# Patient Record
Sex: MALE | Race: BLACK OR AFRICAN AMERICAN | NOT HISPANIC OR LATINO | Employment: FULL TIME | URBAN - METROPOLITAN AREA
[De-identification: names, ages, dates, MRNs, and addresses within clinical notes are randomized per-mention and may not be internally consistent; named-entity substitution may affect disease eponyms.]

---

## 2024-04-25 ENCOUNTER — OFFICE VISIT (OUTPATIENT)
Dept: OTOLARYNGOLOGY | Facility: CLINIC | Age: 29
End: 2024-04-25
Payer: COMMERCIAL

## 2024-04-25 VITALS — WEIGHT: 220 LBS | HEIGHT: 71 IN | BODY MASS INDEX: 30.8 KG/M2

## 2024-04-25 DIAGNOSIS — J34.89 NASAL AND SINUS DISCHARGE: ICD-10-CM

## 2024-04-25 DIAGNOSIS — J34.2 NASAL SEPTAL DEVIATION: ICD-10-CM

## 2024-04-25 DIAGNOSIS — R04.0 EPISTAXIS: ICD-10-CM

## 2024-04-25 DIAGNOSIS — J32.8 OTHER CHRONIC SINUSITIS: Primary | ICD-10-CM

## 2024-04-25 DIAGNOSIS — J34.89 NASAL OBSTRUCTION: ICD-10-CM

## 2024-04-25 PROBLEM — R10.11 ABDOMINAL PAIN, RIGHT UPPER QUADRANT: Status: ACTIVE | Noted: 2020-12-14

## 2024-04-25 PROBLEM — K81.9 CHOLECYSTITIS: Status: ACTIVE | Noted: 2020-12-14

## 2024-04-25 PROCEDURE — 99204 OFFICE O/P NEW MOD 45 MIN: CPT | Performed by: NURSE PRACTITIONER

## 2024-04-25 PROCEDURE — 31231 NASAL ENDOSCOPY DX: CPT | Performed by: NURSE PRACTITIONER

## 2024-04-25 PROCEDURE — 1036F TOBACCO NON-USER: CPT | Performed by: NURSE PRACTITIONER

## 2024-04-25 RX ORDER — DOXYCYCLINE 100 MG/1
100 TABLET ORAL 2 TIMES DAILY
Qty: 20 TABLET | Refills: 0 | Status: SHIPPED | OUTPATIENT
Start: 2024-04-25 | End: 2024-05-05

## 2024-04-25 RX ORDER — AMOXICILLIN 875 MG/1
875 TABLET, FILM COATED ORAL 2 TIMES DAILY
COMMUNITY
Start: 2024-04-21 | End: 2024-05-01

## 2024-04-25 RX ORDER — MUPIROCIN 20 MG/G
1 OINTMENT TOPICAL
Qty: 30 G | Refills: 0 | Status: SHIPPED | OUTPATIENT
Start: 2024-04-25 | End: 2024-08-03

## 2024-04-25 NOTE — PROGRESS NOTES
Subjective   Patient ID: Jakub Goins is a 28 y.o. male who presents for sinus issues .  HPI  Jakub Goins is a 28 y.o. old male   presenting with complaints of sinus and nose problems that began about 1 week ago. He notes chronic history of sinus issues and these exacerbations occur every few months where they peak. The patient describes the sinus/nose problems as gradual onset of facial pressure, headaches, posterior nasal drainage (colored). He has intermittent nasal obstruction. Patient denies rhinorrhea, facial pain, periorbital edema, throat clearing, hoarseness, loss of taste, and loss of smell. The patient reports epistaxis, frequently out of the left nostril. The patient has taken antibiotics, claritin D, afrin medications to alleviate the problem. The medications have not helped. The patient has not tried nasal saline irrigations. Does not have a history of allergic rhinitis or allergies. They deny a history of aspirin sensitivity. They report 3-4 sinus infections per year requiring antibiotic treatment. Most recent antibiotic usage includes: Amoxicillin x 7 days, started 5 days ago after being at urgent care.     History of prior nasal/sinus surgery or procedure: Denies    Review of Systems  Review of systems is negative for constitutional, ophthalmological, cardiac, pulmonary, renal, gastrointestinal, musculoskeletal, mental health, endocrine, or neurologic disorders (except as listed in the HPI, PMH, and Problem List).     Objective   Physical Exam  CONSTITUTIONAL: Vital signs reviewed. Patient appears well developed and well nourished.   GENERAL: this is a healthy appearing male who appears stated age. The patient is alert and appropriately verbally conversant without hoarseness. This patient is in no apparent distress.   FACE: The face was inspected and no cutaneous masses or lesions were visualized. There was no erythema or edema noted. Facial movement was symmetric. No skin lesions were  detected. There was no sinus tenderness elicited. TMJ crepitus absent.   EYES: Extra-ocular muscle function was intact. No nystagmus was observed. Pupils were equal.   CRANIAL NERVES: Cranial nerves II, III, IV, and VI were noted to be intact via extra-ocular muscle movement testing. Cranial nerve VII noted to be intact and symmetric by facial movement. Cranial nerves IX and X noted to be intact by gag reflex and palatal movement. Cranial nerve XII noted to be intact by active and symmetric tongue movement.   NOSE: Examination of the nose revealed the nasal dorsum to be midline. Intranasal exam reveals the septum is deviated right. There are thick scabs along the septum. The inferior turbinates were hypertrophic. No masses, polyps, mucopus, or other lesion on anterior rhinoscopy. See below procedure note as applicable for further exam.  ORAL CAVITY: Examination of the oral cavity revealed no mass lesions nor infection. The palate was noted to be intact. The tongue exhibited normal mobility. Mucosa was moist without lesion. The lips were free of lesion. Gums were free of inflammation. Dentition: normal without obvious infection or inflammation  OROPHARYNX: The oral pharynx was free of mass lesion or mucosal abnormality. The palate was noted to be without lesion. The uvula was normal appearing. The tonsils were Normal.  EARS: Examination of the ears revealed that the auricles were normally formed with no lesions. The external auditory canals were normal. The tympanic membranes were intact.  There is no inflammation visualized.   NECK: Visualization and palpation of the neck revealed no mass lesions. No skin lesions or inflammatory processes were detected. The cervical musculature was normal to palpation.   CERVICAL LYMPHATICS: There were no palpable lymph nodes in the posterior triangle, submandibular triangle, jugulodigastric region, or central neck.  RESPIRATORY: Normal inspiration and expiration and chest wall  expansion, no use of accessory muscles to breathe, no stridor.  NEUROLOGICAL: Patient is ambulatory without assist. Mentation is clear. Answering questions appropriately.     Nasal / sinus endoscopy    Date/Time: 4/25/2024 2:12 PM    Performed by: IRAM Mcdonald  Authorized by: IRAM Mcdonald    Consent:     Consent obtained:  Verbal    Consent given by:  Patient    Risks discussed:  Bleeding, infection and pain    Alternatives discussed:  No treatment, observation and delayed treatment  Procedure details:     Indications: assessment of airway and sino-nasal symptoms      Medication:  Afrin and Reji-Synephrine 2%    Instrument: flexible fiberoptic nasal endoscope      Scope location: bilateral nare    Post-procedure details:     Patient tolerance of procedure:  Tolerated well, no immediate complications  Comments:      Findings: After topical decongestion with decongestant and anesthetic spray, nasal endoscopy was performed using an endoscope. The septum was deviated right. There are large scabs along the anterior septum bilaterally. The inferior turbinates were hypertrophic.  The middle turbinates appeared healthy, the middle meatus is free of purulence, masses, lesions or polyps on the right. On the left, thick mucopurulence from the middle meatus. The superior meatus and sphenoethmoid recess are clear bilaterally. The nasal passageway is patent. The nasopharynx was clear. There were no complications and the patient tolerated the procedure well.        Assessment/Plan     ASSESSMENT:  Jakub Goins is a 28 y.o. year old male with symptoms of nasal drainage, headaches, facial pressure and clinical findings consistent with acute sinusitis versus exacerbation of chronic sinusitis. Patient also noted to have a right nasal septal deviation and thick crusts along the anterior septum. Recommended altering to doxycycline, begin fluticasone and mupirocin ointment.      PLAN:  1) Nasal  Endoscopy today.Findings: right dev, ITH, mucopurulence from left middle meatus  Reassurance and counseling provided to patient and his condition was extensively discussed including as noted below:  2) We will start the patient on antibiotics to treat current infection. Rx for doxycycline BID x 10 days. Advised to take with food and discontinue for adverse effects.  3) Patient instructed to begin saline irrigations, ideally 1-2 times daily. Discussed appropriate technique and provided with a sample bottle.   4) Patient was instructed to start steroid nasal spray after the irrigations.  5) The patient will follow up in 1 month or sooner if needed to determine further steps in care.

## 2024-04-25 NOTE — PATIENT INSTRUCTIONS
Today you were evaluated by Danni Malone CNP.    Please follow-up in 4 weeks or sooner if needed. If you have any questions or concerns, please contact my office at [phone].     You will be started on Doxycycline, twice daily, for 10 days.    As discussed, use of doxycycline can cause a skin sensitivity reaction with the sun. Avoid sun exposure while taking this medication. Also, do not take this medication with dairy. Take a daily probiotic.     MEDICATED NASAL SPRAY INSTRUCTIONS  Please take the prescribed nasal spray as directed. BE SURE TO POINT THE SPRAY TOWARDS THE CORNER OF THE EYE ON THE SAME SIDE NOSTRIL. This will ensure you are treating the appropriate parts of your nose that are swollen or inflamed.  This medication will take upwards of one month before you notice full benefit. You MUST use it every day for it to be effective.     - Begin Mupirocin ointment 3 times daily FOR 2-4 WEEKS as directed. Use the pads of your fingers to apply the ointment and then sniff back gently. Do not use a cue tip or finger nail to place the ointment as this can cause further trauma. IF THE PRESCRIBED OINTMENT IS TOO EXPENSIVE THEN JUST USE OVER THE COUNTER BACITRACIN OR TRIPLE ANTIBIOTIC OINTMENT.

## 2024-04-27 ENCOUNTER — HOSPITAL ENCOUNTER (EMERGENCY)
Facility: HOSPITAL | Age: 29
Discharge: HOME | End: 2024-04-27
Attending: EMERGENCY MEDICINE
Payer: COMMERCIAL

## 2024-04-27 ENCOUNTER — APPOINTMENT (OUTPATIENT)
Dept: RADIOLOGY | Facility: HOSPITAL | Age: 29
End: 2024-04-27
Payer: COMMERCIAL

## 2024-04-27 VITALS
OXYGEN SATURATION: 100 % | TEMPERATURE: 97.3 F | RESPIRATION RATE: 18 BRPM | HEART RATE: 64 BPM | BODY MASS INDEX: 30.8 KG/M2 | WEIGHT: 220 LBS | HEIGHT: 71 IN | DIASTOLIC BLOOD PRESSURE: 71 MMHG | SYSTOLIC BLOOD PRESSURE: 121 MMHG

## 2024-04-27 DIAGNOSIS — R42 DIZZINESS: Primary | ICD-10-CM

## 2024-04-27 LAB
ALBUMIN SERPL BCP-MCNC: 4.9 G/DL (ref 3.4–5)
ALP SERPL-CCNC: 50 U/L (ref 33–120)
ALT SERPL W P-5'-P-CCNC: 31 U/L (ref 10–52)
ANION GAP SERPL CALC-SCNC: 9 MMOL/L (ref 10–20)
AST SERPL W P-5'-P-CCNC: 23 U/L (ref 9–39)
BASOPHILS # BLD AUTO: 0.02 X10*3/UL (ref 0–0.1)
BASOPHILS NFR BLD AUTO: 0.2 %
BILIRUB SERPL-MCNC: 4 MG/DL (ref 0–1.2)
BUN SERPL-MCNC: 8 MG/DL (ref 6–23)
CALCIUM SERPL-MCNC: 9.3 MG/DL (ref 8.6–10.3)
CHLORIDE SERPL-SCNC: 106 MMOL/L (ref 98–107)
CO2 SERPL-SCNC: 28 MMOL/L (ref 21–32)
CREAT SERPL-MCNC: 0.99 MG/DL (ref 0.5–1.3)
EGFRCR SERPLBLD CKD-EPI 2021: >90 ML/MIN/1.73M*2
EOSINOPHIL # BLD AUTO: 0.16 X10*3/UL (ref 0–0.7)
EOSINOPHIL NFR BLD AUTO: 1.9 %
ERYTHROCYTE [DISTWIDTH] IN BLOOD BY AUTOMATED COUNT: 12.8 % (ref 11.5–14.5)
GLUCOSE SERPL-MCNC: 83 MG/DL (ref 74–99)
HCT VFR BLD AUTO: 40.5 % (ref 41–52)
HGB BLD-MCNC: 15.1 G/DL (ref 13.5–17.5)
IMM GRANULOCYTES # BLD AUTO: 0.02 X10*3/UL (ref 0–0.7)
IMM GRANULOCYTES NFR BLD AUTO: 0.2 % (ref 0–0.9)
LYMPHOCYTES # BLD AUTO: 1.35 X10*3/UL (ref 1.2–4.8)
LYMPHOCYTES NFR BLD AUTO: 16.1 %
MCH RBC QN AUTO: 32.7 PG (ref 26–34)
MCHC RBC AUTO-ENTMCNC: 37.3 G/DL (ref 32–36)
MCV RBC AUTO: 88 FL (ref 80–100)
MONOCYTES # BLD AUTO: 0.47 X10*3/UL (ref 0.1–1)
MONOCYTES NFR BLD AUTO: 5.6 %
NEUTROPHILS # BLD AUTO: 6.34 X10*3/UL (ref 1.2–7.7)
NEUTROPHILS NFR BLD AUTO: 76 %
NRBC BLD-RTO: 0 /100 WBCS (ref 0–0)
PLATELET # BLD AUTO: 203 X10*3/UL (ref 150–450)
POTASSIUM SERPL-SCNC: 3.9 MMOL/L (ref 3.5–5.3)
PROT SERPL-MCNC: 6.5 G/DL (ref 6.4–8.2)
RBC # BLD AUTO: 4.62 X10*6/UL (ref 4.5–5.9)
SODIUM SERPL-SCNC: 139 MMOL/L (ref 136–145)
WBC # BLD AUTO: 8.4 X10*3/UL (ref 4.4–11.3)

## 2024-04-27 PROCEDURE — 70450 CT HEAD/BRAIN W/O DYE: CPT | Performed by: RADIOLOGY

## 2024-04-27 PROCEDURE — 99284 EMERGENCY DEPT VISIT MOD MDM: CPT | Mod: 25

## 2024-04-27 PROCEDURE — 36415 COLL VENOUS BLD VENIPUNCTURE: CPT | Performed by: EMERGENCY MEDICINE

## 2024-04-27 PROCEDURE — 85025 COMPLETE CBC W/AUTO DIFF WBC: CPT | Performed by: EMERGENCY MEDICINE

## 2024-04-27 PROCEDURE — 70450 CT HEAD/BRAIN W/O DYE: CPT

## 2024-04-27 PROCEDURE — 80053 COMPREHEN METABOLIC PANEL: CPT | Performed by: EMERGENCY MEDICINE

## 2024-04-27 RX ORDER — MECLIZINE HYDROCHLORIDE 25 MG/1
25 TABLET ORAL 3 TIMES DAILY PRN
Qty: 20 TABLET | Refills: 0 | Status: SHIPPED | OUTPATIENT
Start: 2024-04-27 | End: 2024-04-27

## 2024-04-27 RX ORDER — MECLIZINE HYDROCHLORIDE 25 MG/1
25 TABLET ORAL 3 TIMES DAILY PRN
Qty: 20 TABLET | Refills: 0 | Status: SHIPPED | OUTPATIENT
Start: 2024-04-27

## 2024-04-27 ASSESSMENT — PAIN - FUNCTIONAL ASSESSMENT: PAIN_FUNCTIONAL_ASSESSMENT: 0-10

## 2024-04-27 ASSESSMENT — LIFESTYLE VARIABLES
EVER HAD A DRINK FIRST THING IN THE MORNING TO STEADY YOUR NERVES TO GET RID OF A HANGOVER: NO
TOTAL SCORE: 0
HAVE PEOPLE ANNOYED YOU BY CRITICIZING YOUR DRINKING: NO
HAVE YOU EVER FELT YOU SHOULD CUT DOWN ON YOUR DRINKING: NO
EVER FELT BAD OR GUILTY ABOUT YOUR DRINKING: NO

## 2024-04-27 ASSESSMENT — PAIN DESCRIPTION - LOCATION: LOCATION: HEAD

## 2024-04-27 ASSESSMENT — PAIN DESCRIPTION - DESCRIPTORS: DESCRIPTORS: ACHING

## 2024-04-27 ASSESSMENT — PAIN SCALES - GENERAL: PAINLEVEL_OUTOF10: 7

## 2024-04-27 NOTE — ED PROVIDER NOTES
HPI   Chief Complaint   Patient presents with   • headache/ general malaise after two rounds antibiotics for        Patient presents with headache, generalized malaise.  Has had this for couple of weeks.  He has been having dizzy spells.  He describes more of a lightheadedness and sometimes off balance.  No room spinning sensation.  He denies any chest pain or shortness of breath associated with this.  He went to an urgent care and they gave him amoxicillin.  He then followed up with an ENT and they did a scope through the nose and they found sinusitis so they put him on doxycycline.  Is not getting any better.  He states he feels generally unwell.  Has been eating less but still keeping himself hydrated.  No documented fevers.                          Ozan Coma Scale Score: 15                  Patient History   No past medical history on file.  No past surgical history on file.  No family history on file.  Social History     Tobacco Use   • Smoking status: Never   • Smokeless tobacco: Never   Substance Use Topics   • Alcohol use: Not Currently   • Drug use: Never       Physical Exam   ED Triage Vitals [04/27/24 1211]   Temperature Heart Rate Respirations BP   36.3 °C (97.3 °F) 64 18 121/71      Pulse Ox Temp Source Heart Rate Source Patient Position   100 % Temporal Monitor Sitting      BP Location FiO2 (%)     Left arm --       Physical Exam  Vitals and nursing note reviewed.   Constitutional:       Appearance: Normal appearance.   HENT:      Head: Normocephalic and atraumatic.      Mouth/Throat:      Mouth: Mucous membranes are moist.   Eyes:      Extraocular Movements: Extraocular movements intact.      Pupils: Pupils are equal, round, and reactive to light.   Cardiovascular:      Rate and Rhythm: Normal rate and regular rhythm.      Heart sounds: No murmur heard.  Pulmonary:      Effort: Pulmonary effort is normal. No respiratory distress.      Breath sounds: Normal breath sounds.   Abdominal:      General:  There is no distension.      Palpations: Abdomen is soft.      Tenderness: There is no abdominal tenderness.   Musculoskeletal:         General: No deformity. Normal range of motion.      Cervical back: Neck supple.   Skin:     General: Skin is warm and dry.      Findings: No lesion or rash.   Neurological:      General: No focal deficit present.      Mental Status: He is alert and oriented to person, place, and time.      Sensory: No sensory deficit.      Motor: No weakness.      Comments: NIH equals 0   Psychiatric:         Behavior: Behavior normal.       Labs Reviewed   CBC WITH AUTO DIFFERENTIAL   COMPREHENSIVE METABOLIC PANEL     CT head wo IV contrast    (Results Pending)     ED Course & MDM   Diagnoses as of 04/27/24 1078   Dizziness       Medical Decision Making  Differentials include sinusitis, viral illness, dehydration, migraine headache.  Imaging studies, if performed, were independently reviewed and interpreted by myself and confirmed by radiologist. EKG(s), if performed, were interpreted by myself.  The patient does not have a room spinning sensation.  I do not feel that this is vertiginous in nature.The patient had laboratory studies which were unremarkable.  CAT scan of the head is also unremarkable.  His main concern was the dizziness.  I do not see anything in his CAT scan that would contribute to this.  I will give him meclizine to see if this will help.  He will need to follow-up with ENT for further testing.        Procedure  Procedures     Christian Aguilar MD  04/27/24 8614

## 2024-05-31 ENCOUNTER — OFFICE VISIT (OUTPATIENT)
Dept: OTOLARYNGOLOGY | Facility: CLINIC | Age: 29
End: 2024-05-31
Payer: COMMERCIAL

## 2024-05-31 VITALS — WEIGHT: 210 LBS | HEIGHT: 71 IN | BODY MASS INDEX: 29.4 KG/M2

## 2024-05-31 DIAGNOSIS — J01.30 ACUTE NON-RECURRENT SPHENOIDAL SINUSITIS: ICD-10-CM

## 2024-05-31 DIAGNOSIS — J34.2 NASAL SEPTAL DEVIATION: ICD-10-CM

## 2024-05-31 DIAGNOSIS — J34.89 NASAL AND SINUS DISCHARGE: Primary | ICD-10-CM

## 2024-05-31 PROBLEM — J02.9 SORE THROAT: Status: ACTIVE | Noted: 2024-05-31

## 2024-05-31 PROBLEM — J32.9 CHRONIC SINUSITIS: Status: ACTIVE | Noted: 2024-05-31

## 2024-05-31 PROBLEM — R04.0 EPISTAXIS: Status: ACTIVE | Noted: 2024-05-31

## 2024-05-31 PROBLEM — R42 DIZZINESS AND GIDDINESS: Status: ACTIVE | Noted: 2024-04-17

## 2024-05-31 PROCEDURE — 99213 OFFICE O/P EST LOW 20 MIN: CPT | Performed by: NURSE PRACTITIONER

## 2024-05-31 PROCEDURE — 1036F TOBACCO NON-USER: CPT | Performed by: NURSE PRACTITIONER

## 2024-05-31 NOTE — PROGRESS NOTES
Subjective   Patient ID: Jakub Goins is a 29 y.o. male who presents for No chief complaint on file..  HPI  5/31/24- Patient presents for follow-up. He notes that he went to the ER about 2 days after the visit, he felt even worse, felt that he had a fever. He did have a head CT at that time, which I reviewed today. Today, he feels that his symptoms have resolved. He denies colored drainage, nasal obstruction, facial pressure. He does note occasional pressure changes and feeling his sinuses. He denies any vertex area headaches. He felt that the dizziness did also resolve. He is no longer using anything in his nose.     I personally reviewed the patients CT scan images and results. I discussed the results personally with the patient. The following findings were discussed:4/27/24: CT Head: There is scattered inflammation and secretions in the posterior ethmoids and anterior aspect of the sphenoid sinus. Otherwise, visualized portion of the paranasal sinuses appear clear.     Review of Systems  Review of systems is negative for constitutional, ophthalmological, cardiac, pulmonary, renal, gastrointestinal, musculoskeletal, mental health, endocrine, or neurologic disorders (except as listed in the HPI, PMH, and Problem List).     Objective   Physical Exam  CONSTITUTIONAL:  Patient appears well developed and well nourished.   GENERAL: this is a healthy appearing male who appears stated age. The patient is alert and appropriately verbally conversant without hoarseness. This patient is in no apparent distress.   FACE: The face was inspected and no cutaneous masses or lesions were visualized. There was no erythema or edema noted. Facial movement was symmetric. No skin lesions were detected.   EYES: Extra-ocular muscle function was intact. No nystagmus was observed. Pupils were equal.   NOSE: Examination of the nose revealed the nasal dorsum to be midline. RESPIRATORY: Normal inspiration and expiration and chest wall  expansion, no use of accessory muscles to breathe, no stridor.  NEUROLOGICAL: Patient is ambulatory without assist. Mentation is clear. Answering questions appropriately.       Assessment/Plan     ASSESSMENT:  Jakub Goins is a 29 y.o. year old male with symptoms of nasal drainage, headaches, facial pressure and clinical findings consistent with acute sinusitis versus exacerbation of chronic sinusitis. Patient also noted to have a right nasal septal deviation and thick crusts along the anterior septum. Recommended altering to doxycycline, begin fluticasone and mupirocin ointment.  5/31/24- Resolved symptoms after doxycycline. Reviewed CT with evidence of mucous in the posterior ethmoid/sphenoid 2 days after antibiotic therapy.      PLAN:  1) I personally reviewed the patients CT scan images and results. I discussed the results personally with the patient. The following findings were discussed:4/27/24: CT Head: There is scattered inflammation and secretions in the posterior ethmoids and anterior aspect of the sphenoid sinus. Otherwise, visualized portion of the paranasal sinuses appear clear.   Reassurance and counseling provided to patient and his condition was extensively discussed including as noted below:  2) The patient and I discussed his scan at length today. He is no longer symptomatic. We discussed that this was likely an episode of acute sinusitis. He no longer has difficulty with his nasal breathing, therefore, I would not recommend a septoplasty or turbinate reduction. Discussed if symptoms return, would treat with antibiotic therapy and obtain a CT Sinus Volumetric.   3) We also discussed that imaging is not revealing of any opacification of the sinuses, therefore, would not recommend a post-treatment CT scan.  4) Recommended follow up with me if symptoms return.

## 2024-05-31 NOTE — PATIENT INSTRUCTIONS
Today you were evaluated by Danni Malone CNP.    Please follow-up as needed. If you have any questions or concerns, please contact my office at [phone].